# Patient Record
Sex: MALE | Race: WHITE | NOT HISPANIC OR LATINO
[De-identification: names, ages, dates, MRNs, and addresses within clinical notes are randomized per-mention and may not be internally consistent; named-entity substitution may affect disease eponyms.]

---

## 2017-07-08 ENCOUNTER — TRANSCRIPTION ENCOUNTER (OUTPATIENT)
Age: 77
End: 2017-07-08

## 2019-04-29 ENCOUNTER — APPOINTMENT (OUTPATIENT)
Dept: GASTROENTEROLOGY | Facility: CLINIC | Age: 79
End: 2019-04-29
Payer: MEDICARE

## 2019-04-29 VITALS
DIASTOLIC BLOOD PRESSURE: 97 MMHG | BODY MASS INDEX: 21.83 KG/M2 | SYSTOLIC BLOOD PRESSURE: 222 MMHG | HEIGHT: 65 IN | TEMPERATURE: 98.2 F | HEART RATE: 79 BPM | WEIGHT: 131 LBS

## 2019-04-29 DIAGNOSIS — T14.8XXA OTHER INJURY OF UNSPECIFIED BODY REGION, INITIAL ENCOUNTER: ICD-10-CM

## 2019-04-29 DIAGNOSIS — K62.5 HEMORRHAGE OF ANUS AND RECTUM: ICD-10-CM

## 2019-04-29 DIAGNOSIS — K64.8 OTHER HEMORRHOIDS: ICD-10-CM

## 2019-04-29 DIAGNOSIS — K59.00 CONSTIPATION, UNSPECIFIED: ICD-10-CM

## 2019-04-29 DIAGNOSIS — K64.4 RESIDUAL HEMORRHOIDAL SKIN TAGS: ICD-10-CM

## 2019-04-29 DIAGNOSIS — F32.9 MAJOR DEPRESSIVE DISORDER, SINGLE EPISODE, UNSPECIFIED: ICD-10-CM

## 2019-04-29 PROBLEM — Z00.00 ENCOUNTER FOR PREVENTIVE HEALTH EXAMINATION: Noted: 2019-04-29

## 2019-04-29 PROCEDURE — 99215 OFFICE O/P EST HI 40 MIN: CPT

## 2019-04-29 NOTE — HISTORY OF PRESENT ILLNESS
[Constipation] : constipation worsened [de-identified] : Patient with increasing constipation over last several weeks\par He has had rectal bleeding scant on paper for few days with increased straining.  Known hemorrhoids. Reviewed last colonoscopy with pt 2016\par He has added metamucil, prunes, and colace

## 2019-04-29 NOTE — PHYSICAL EXAM
[General Appearance - Alert] : alert [General Appearance - In No Acute Distress] : in no acute distress [Sclera] : the sclera and conjunctiva were normal [Neck Appearance] : the appearance of the neck was normal [Respiration, Rhythm And Depth] : normal respiratory rhythm and effort [Auscultation Breath Sounds / Voice Sounds] : lungs were clear to auscultation bilaterally [Heart Rate And Rhythm] : heart rate was normal and rhythm regular [Heart Sounds] : normal S1 and S2 [Murmurs] : no murmurs [Heart Sounds Pericardial Friction Rub] : no pericardial rub [Bowel Sounds] : normal bowel sounds [Abdomen Soft] : soft [Abdomen Hernia] : no hernia was discovered [Normal Sphincter Tone] : normal sphincter tone [No Rectal Mass] : no rectal mass [External Hemorrhoid] : external hemorrhoids [Cervical Lymph Nodes Enlarged Posterior Bilaterally] : posterior cervical [Abnormal Walk] : normal gait [Oriented To Time, Place, And Person] : oriented to person, place, and time [Impaired Insight] : insight and judgment were intact [Affect] : the affect was normal [Mood] : the mood was normal

## 2019-04-29 NOTE — REVIEW OF SYSTEMS
[Fever] : no fever [As Noted in HPI] : as noted in HPI [Constipation] : constipation [Anxiety] : anxiety [Depression] : depression [Negative] : Heme/Lymph

## 2019-04-29 NOTE — ASSESSMENT
[FreeTextEntry1] : Patient with increased constipation and rectal bleeding\par He has started otc meds and may be helping\par for hemorrhoids will start topical therapy\par if not improved with persistent bleeding, will consider colonoscopy

## 2019-04-30 PROBLEM — Z00.00 ENCOUNTER FOR PREVENTIVE HEALTH EXAMINATION: Status: ACTIVE | Noted: 2019-04-30

## 2019-05-30 ENCOUNTER — APPOINTMENT (OUTPATIENT)
Dept: GASTROENTEROLOGY | Facility: CLINIC | Age: 79
End: 2019-05-30

## 2019-06-24 ENCOUNTER — RX RENEWAL (OUTPATIENT)
Age: 79
End: 2019-06-24

## 2020-09-30 ENCOUNTER — RECORD ABSTRACTING (OUTPATIENT)
Age: 80
End: 2020-09-30

## 2020-09-30 PROBLEM — J32.0 CHRONIC MAXILLARY SINUSITIS: Status: ACTIVE | Noted: 2020-09-30

## 2020-09-30 RX ORDER — FLUTICASONE FUROATE AND VILANTEROL TRIFENATATE 200; 25 UG/1; UG/1
200-25 POWDER RESPIRATORY (INHALATION)
Refills: 0 | Status: ACTIVE | COMMUNITY

## 2020-10-06 ENCOUNTER — APPOINTMENT (OUTPATIENT)
Dept: OTOLARYNGOLOGY | Facility: CLINIC | Age: 80
End: 2020-10-06
Payer: MEDICARE

## 2020-10-06 VITALS
DIASTOLIC BLOOD PRESSURE: 72 MMHG | TEMPERATURE: 97.6 F | HEART RATE: 55 BPM | WEIGHT: 128 LBS | HEIGHT: 65 IN | SYSTOLIC BLOOD PRESSURE: 178 MMHG | BODY MASS INDEX: 21.33 KG/M2

## 2020-10-06 DIAGNOSIS — J32.0 CHRONIC MAXILLARY SINUSITIS: ICD-10-CM

## 2020-10-06 PROCEDURE — 99213 OFFICE O/P EST LOW 20 MIN: CPT | Mod: 25

## 2020-10-06 PROCEDURE — 31231 NASAL ENDOSCOPY DX: CPT

## 2020-10-06 RX ORDER — METHYLPREDNISOLONE 4 MG/1
4 TABLET ORAL
Refills: 0 | Status: DISCONTINUED | COMMUNITY
End: 2020-10-06

## 2020-10-06 RX ORDER — PAROXETINE HYDROCHLORIDE 20 MG/1
20 TABLET, FILM COATED ORAL
Refills: 0 | Status: DISCONTINUED | COMMUNITY
End: 2020-10-06

## 2020-10-06 RX ORDER — AZELASTINE HYDROCHLORIDE 137 UG/1
0.1 SPRAY, METERED NASAL
Refills: 0 | Status: DISCONTINUED | COMMUNITY
End: 2020-10-06

## 2020-10-06 RX ORDER — PRAMOXINE HYDROCHLORIDE HYDROCORTISONE ACETATE 100; 100 MG/10G; MG/10G
1-1 AEROSOL, FOAM TOPICAL
Qty: 10 | Refills: 3 | Status: DISCONTINUED | COMMUNITY
Start: 2019-04-29 | End: 2020-10-06

## 2020-10-06 RX ORDER — PAROXETINE HYDROCHLORIDE 20 MG/1
20 TABLET, FILM COATED ORAL
Refills: 0 | Status: ACTIVE | COMMUNITY

## 2020-10-06 RX ORDER — SULFAMETHOXAZOLE AND TRIMETHOPRIM 800; 160 MG/1; MG/1
800-160 TABLET ORAL
Refills: 0 | Status: DISCONTINUED | COMMUNITY
End: 2020-10-06

## 2020-10-06 NOTE — PROCEDURE
[FreeTextEntry6] : Procedure:  Rigid Nasal Endoscopy: Risks, benefits, and alternatives of rigid endoscopy were explained to the patient.  Specific mention was made of the risk of throat numbness. The patient gave oral consent to proceed.  The nasal cavities were decongested and anesthetized with a combination of oxymetazoline and 4% lidocaine solution.  The rigid scope was inserted into the left nasal cavity.  Endoscopy of the inferior and middle meatus was performed.  No polyp, mass, or lesion was appreciated.  Olfactory cleft was clear. Spheno-ethmoid recess clear. Nasopharynx was clear. Middle meatus open and clear.  Turbinates were without mass. Blood on the cotton. The procedure was repeated on the contralateral side with middle meatus wide open and clear back to the ethmoids, sinus ostium all open.

## 2020-10-06 NOTE — REVIEW OF SYSTEMS
[Seasonal Allergies] : seasonal allergies [Sinus Pain] : sinus pain [Sinus Pressure] : sinus pressure [Sense Of Smell Problem] : sense of smell problem

## 2020-10-06 NOTE — CONSULT LETTER
[Consult Letter:] : I had the pleasure of evaluating your patient, [unfilled]. [Please see my note below.] : Please see my note below. [Consult Closing:] : Thank you very much for allowing me to participate in the care of this patient.  If you have any questions, please do not hesitate to contact me. [Sincerely,] : Sincerely, [Dear  ___] : Dear  [unfilled], [FreeTextEntry3] : Jose Francisco Roach MD FACS

## 2020-10-06 NOTE — HISTORY OF PRESENT ILLNESS
[de-identified] : The patient presents with h/o mild asthma, nasal polyps, anosmia and dysgeusia. He is still experiencing intermittent anosmia and dysgeusia. Pt is covid-19 negative. He has tried the Xhance with no improvement.

## 2020-10-06 NOTE — PHYSICAL EXAM
[Hearing Dan Test (Tuning Fork On Forehead)] : no lateralization of tone [Removed] : previously removed [Normal] : no rashes [Hearing Loss Right Only] : normal [Hearing Loss Left Only] : normal [FreeTextEntry1] : no drip or drainage [FreeTextEntry2] : sinuses nontender to percussion [de-identified] : sensations intact

## 2020-10-06 NOTE — ASSESSMENT
[FreeTextEntry1] : continued intermittent anosmia and dysgeusia\par \par Plan:\par Rigid nasal endoscopy. Continue sinus rinse with Pulmicort. Discussed r/b/a of oral steroids - pt doesn't want to take steroids at this point. FU 6 months.

## 2020-10-06 NOTE — ADDENDUM
[FreeTextEntry1] : Documented by Clarisse Branch acting as scribe for Dr. Roach on 10/06/2020.\par \par All Medical record entries made by the Scribe were at my, Dr. Roach, direction and personally dictated by me on 10/06/2020 . I have reviewed the chart and agree that the record accurately reflects my personal performance of the history, physical exam, assessment and plan. I have also personally directed, reviewed, and agreed with the discharge instructions.

## 2020-12-16 ENCOUNTER — APPOINTMENT (OUTPATIENT)
Dept: UROLOGY | Facility: CLINIC | Age: 80
End: 2020-12-16
Payer: MEDICARE

## 2020-12-16 PROCEDURE — 99204 OFFICE O/P NEW MOD 45 MIN: CPT

## 2020-12-17 LAB
APPEARANCE: CLEAR
BACTERIA: NEGATIVE
BILIRUBIN URINE: NEGATIVE
BLOOD URINE: NEGATIVE
COLOR: YELLOW
GLUCOSE QUALITATIVE U: NEGATIVE
HYALINE CASTS: 2 /LPF
KETONES URINE: NEGATIVE
LEUKOCYTE ESTERASE URINE: NEGATIVE
MICROSCOPIC-UA: NORMAL
NITRITE URINE: NEGATIVE
PH URINE: 6
PROTEIN URINE: NORMAL
PSA FREE FLD-MCNC: 44 %
PSA FREE SERPL-MCNC: 4 NG/ML
PSA SERPL-MCNC: 9.06 NG/ML
RED BLOOD CELLS URINE: 1 /HPF
SPECIFIC GRAVITY URINE: 1.02
SQUAMOUS EPITHELIAL CELLS: 1 /HPF
UROBILINOGEN URINE: NORMAL
WHITE BLOOD CELLS URINE: 0 /HPF

## 2021-03-16 ENCOUNTER — APPOINTMENT (OUTPATIENT)
Dept: GASTROENTEROLOGY | Facility: CLINIC | Age: 81
End: 2021-03-16
Payer: MEDICARE

## 2021-03-16 VITALS
WEIGHT: 125 LBS | HEIGHT: 65 IN | DIASTOLIC BLOOD PRESSURE: 94 MMHG | TEMPERATURE: 98 F | BODY MASS INDEX: 20.83 KG/M2 | SYSTOLIC BLOOD PRESSURE: 171 MMHG | HEART RATE: 94 BPM

## 2021-03-16 DIAGNOSIS — Z12.11 ENCOUNTER FOR SCREENING FOR MALIGNANT NEOPLASM OF COLON: ICD-10-CM

## 2021-03-16 PROCEDURE — 99213 OFFICE O/P EST LOW 20 MIN: CPT

## 2021-03-16 PROCEDURE — 99203 OFFICE O/P NEW LOW 30 MIN: CPT

## 2021-03-16 NOTE — ASSESSMENT
[FreeTextEntry1] : Impression colorectal cancer screening. We'll schedule colonoscopy Left patient first evaluated for frequent PVCs, with cardiology

## 2021-03-16 NOTE — HISTORY OF PRESENT ILLNESS
[FreeTextEntry1] : Patient is seen today for followup screening colonoscopy. His last procedure was several years ago. He denies any change of bowel habits or rectal bleeding and he was advised to have followup colonoscopy. This year. He denies heartburn or dysphagia

## 2021-03-16 NOTE — PHYSICAL EXAM
[General Appearance - Alert] : alert [General Appearance - In No Acute Distress] : in no acute distress [Sclera] : the sclera and conjunctiva were normal [PERRL With Normal Accommodation] : pupils were equal in size, round, and reactive to light [Extraocular Movements] : extraocular movements were intact [Outer Ear] : the ears and nose were normal in appearance [Oropharynx] : the oropharynx was normal [Neck Appearance] : the appearance of the neck was normal [Neck Cervical Mass (___cm)] : no neck mass was observed [Jugular Venous Distention Increased] : there was no jugular-venous distention [Thyroid Diffuse Enlargement] : the thyroid was not enlarged [Thyroid Nodule] : there were no palpable thyroid nodules [Auscultation Breath Sounds / Voice Sounds] : lungs were clear to auscultation bilaterally [Apical Impulse] : the apical impulse was normal [Heart Sounds] : normal S1 and S2 [Heart Sounds Gallop] : no gallops [Murmurs] : no murmurs [Heart Sounds Pericardial Friction Rub] : no pericardial rub [FreeTextEntry1] : frequent PVCs [Bowel Sounds] : normal bowel sounds [Abdomen Soft] : soft [Abdomen Tenderness] : non-tender [] : no hepato-splenomegaly [Abdomen Mass (___ Cm)] : no abdominal mass palpated

## 2021-04-05 ENCOUNTER — APPOINTMENT (OUTPATIENT)
Dept: OTOLARYNGOLOGY | Facility: CLINIC | Age: 81
End: 2021-04-05
Payer: MEDICARE

## 2021-04-05 VITALS
DIASTOLIC BLOOD PRESSURE: 88 MMHG | HEIGHT: 65 IN | BODY MASS INDEX: 20.83 KG/M2 | HEART RATE: 55 BPM | TEMPERATURE: 97.4 F | WEIGHT: 125 LBS | SYSTOLIC BLOOD PRESSURE: 170 MMHG

## 2021-04-05 DIAGNOSIS — Z87.09 PERSONAL HISTORY OF OTHER DISEASES OF THE RESPIRATORY SYSTEM: ICD-10-CM

## 2021-04-05 DIAGNOSIS — R43.0 ANOSMIA: ICD-10-CM

## 2021-04-05 PROCEDURE — 31231 NASAL ENDOSCOPY DX: CPT

## 2021-04-05 PROCEDURE — 99214 OFFICE O/P EST MOD 30 MIN: CPT | Mod: 25

## 2021-04-05 RX ORDER — ALBUTEROL SULFATE 90 UG/1
108 (90 BASE) INHALANT RESPIRATORY (INHALATION)
Refills: 0 | Status: COMPLETED | COMMUNITY
End: 2021-04-05

## 2021-04-05 RX ORDER — AZELASTINE HYDROCHLORIDE 137 UG/1
137 SPRAY, METERED NASAL
Refills: 0 | Status: DISCONTINUED | COMMUNITY
End: 2021-04-05

## 2021-04-05 RX ORDER — BUDESONIDE 1 MG/2ML
SUSPENSION RESPIRATORY (INHALATION)
Refills: 0 | Status: ACTIVE | COMMUNITY

## 2021-04-05 RX ORDER — HYDROCORTISONE ACETATE AND PRAMOXINE HYDROCHLORIDE 25; 10 MG/G; MG/G
2.5-1 CREAM TOPICAL
Qty: 1 | Refills: 3 | Status: COMPLETED | COMMUNITY
Start: 2019-04-29 | End: 2021-04-05

## 2021-04-05 RX ORDER — BUDESONIDE 1 MG/2ML
1 INHALANT ORAL
Refills: 0 | Status: COMPLETED | COMMUNITY
End: 2021-04-05

## 2021-04-05 RX ORDER — FLUTICASONE PROPIONATE 50 UG/1
50 SPRAY, METERED NASAL
Refills: 0 | Status: COMPLETED | COMMUNITY
End: 2021-04-05

## 2021-04-05 RX ORDER — SODIUM SULFATE, POTASSIUM SULFATE, MAGNESIUM SULFATE 17.5; 3.13; 1.6 G/ML; G/ML; G/ML
17.5-3.13-1.6 SOLUTION, CONCENTRATE ORAL
Qty: 1 | Refills: 0 | Status: COMPLETED | COMMUNITY
Start: 2021-03-16 | End: 2021-04-05

## 2021-04-05 NOTE — PHYSICAL EXAM
[Hearing Dan Test (Tuning Fork On Forehead)] : no lateralization of tone [Midline] : trachea located in midline position [Normal] : no rashes [FreeTextEntry1] : deviated septum to the right, not obstructive [FreeTextEntry2] : sinuses nontender to percussion

## 2021-04-05 NOTE — HISTORY OF PRESENT ILLNESS
[de-identified] : The patient presents with h/o mild asthma, nasal polyps, anosmia and dysgeusia. Pt reports doing well. He is still using the sinus rinse with Pulmicort. He went back to Xhance with some improvement. He is still exerpiencing intermittent anosmia and dysgeusia.

## 2021-04-05 NOTE — ADDENDUM
[FreeTextEntry1] : Documented by Clarisse Branch acting as scribe for Dr. Roach on 04/05/2021.\par \par All Medical record entries made by the Scribe were at my, Dr. Roach, direction and personally dictated by me on 04/05/2021 . I have reviewed the chart and agree that the record accurately reflects my personal performance of the history, physical exam, assessment and plan. I have also personally directed, reviewed, and agreed with the discharge instructions.

## 2021-04-05 NOTE — ASSESSMENT
[FreeTextEntry1] : Reviewed and reconciled medications, allergies, PMHx, PSHx, SocHx, FMHx.\par \par h/o mild asthma, nasal polyps, anosmia and dysgeusia - improvement with Xhance and sinus rinse with Pulmicort\par continued intermittent anosmia and dysgeusia\par \par Plan:\par Rigid nasal endoscopy. Discussed r/b/a of Dupixent vs steroid eluting stent. Pt opted to start with the stent.

## 2021-04-05 NOTE — PROCEDURE
[FreeTextEntry6] : Procedure:  Rigid Nasal Endoscopy: Risks, benefits, and alternatives of rigid endoscopy were explained to the patient.  Specific mention was made of the risk of throat numbness. The patient gave oral consent to proceed.  The nasal cavities were decongested and anesthetized with a combination of oxymetazoline and 4% lidocaine solution.  The rigid scope was inserted into the left nasal cavity.  Endoscopy of the inferior and middle meatus was performed.  No polyp, mass, or lesion was appreciated.  Olfactory cleft was clear. Spheno-ethmoid recess clear. Nasopharynx was clear. Polypoid degeneration around the middle turbinate.  The procedure was repeated on the contralateral side with polypoid degeneration around the middle turbinate, no obstruction, sinus ostium open.

## 2021-04-05 NOTE — CONSULT LETTER
[Dear  ___] : Dear  [unfilled], [Courtesy Letter:] : I had the pleasure of seeing your patient, [unfilled], in my office today. [Please see my note below.] : Please see my note below. [Consult Closing:] : Thank you very much for allowing me to participate in the care of this patient.  If you have any questions, please do not hesitate to contact me. [Sincerely,] : Sincerely, [FreeTextEntry3] : Jose Francisco Roach MD FACS

## 2021-06-01 RX ORDER — FLUTICASONE PROPIONATE 93 UG/1
93 SPRAY, METERED NASAL
Qty: 1 | Refills: 5 | Status: ACTIVE | COMMUNITY
Start: 2019-12-12 | End: 1900-01-01

## 2021-06-30 ENCOUNTER — APPOINTMENT (OUTPATIENT)
Dept: UROLOGY | Facility: CLINIC | Age: 81
End: 2021-06-30
Payer: MEDICARE

## 2021-06-30 DIAGNOSIS — R97.20 ELEVATED PROSTATE, SPECIFIC ANTIGEN [PSA]: ICD-10-CM

## 2021-06-30 PROCEDURE — 99214 OFFICE O/P EST MOD 30 MIN: CPT

## 2021-07-01 LAB
APPEARANCE: CLEAR
BACTERIA: NEGATIVE
BILIRUBIN URINE: NEGATIVE
BLOOD URINE: NEGATIVE
COLOR: YELLOW
GLUCOSE QUALITATIVE U: NEGATIVE
HYALINE CASTS: 1 /LPF
KETONES URINE: NEGATIVE
LEUKOCYTE ESTERASE URINE: NEGATIVE
MICROSCOPIC-UA: NORMAL
NITRITE URINE: NEGATIVE
PH URINE: 6.5
PROTEIN URINE: NORMAL
PSA FREE FLD-MCNC: 42 %
PSA FREE SERPL-MCNC: 3.18 NG/ML
PSA SERPL-MCNC: 7.63 NG/ML
RED BLOOD CELLS URINE: 0 /HPF
SPECIFIC GRAVITY URINE: 1.01
SQUAMOUS EPITHELIAL CELLS: 0 /HPF
UROBILINOGEN URINE: NORMAL
WHITE BLOOD CELLS URINE: 1 /HPF

## 2021-10-08 ENCOUNTER — APPOINTMENT (OUTPATIENT)
Dept: OTOLARYNGOLOGY | Facility: CLINIC | Age: 81
End: 2021-10-08
Payer: MEDICARE

## 2021-10-08 VITALS
BODY MASS INDEX: 20.83 KG/M2 | HEIGHT: 65 IN | SYSTOLIC BLOOD PRESSURE: 206 MMHG | DIASTOLIC BLOOD PRESSURE: 84 MMHG | WEIGHT: 125 LBS | HEART RATE: 59 BPM

## 2021-10-08 PROCEDURE — 99213 OFFICE O/P EST LOW 20 MIN: CPT | Mod: 25

## 2021-10-08 PROCEDURE — 31231 NASAL ENDOSCOPY DX: CPT

## 2021-10-08 NOTE — REASON FOR VISIT
[Subsequent Evaluation] : a subsequent evaluation for [FreeTextEntry2] : Patient here to check on sinus

## 2021-10-08 NOTE — PROCEDURE
[Recalcitrant Symptoms] : recalcitrant symptoms  [Anatomical Abnormality] : anatomical abnormality [Anterior rhinoscopy insufficient to account for symptoms] : anterior rhinoscopy insufficient to account for symptoms [None] : none [Rigid Endoscope] : examined with a rigid endoscope [FreeTextEntry6] : Rigid nasal endoscopy\par Consent given\par No polyps seen bilaterally [de-identified] : hx of polyps

## 2021-10-08 NOTE — HISTORY OF PRESENT ILLNESS
[de-identified] : Patient with reduced sense of smell. some days and some meals smells well and everything nl. other days reduced. Using pulmicort in sinus rinse and using xhance.  today bp elevated 204/ 84.  Seeing cardiologist. last visit BP nl. Breathing ok.

## 2021-10-08 NOTE — ASSESSMENT
[FreeTextEntry1] : Hx of polyps  on Pulmicort and Xhance\par \par \par Rigid nasal endoscopy\par Consent given\par No polyps seen bilaterally\par \par Plan\par Decrease Xhance to one spray BID\par See PCP concerning elevated blood pressure\par f/u 3 months\par continue drops for increased eye pressure. followup with optho as well.  \par to see dr Wilks or cardiologist today for elevated bp. repeat 224/80 pulse 58

## 2021-10-08 NOTE — PHYSICAL EXAM
[Hearing Dan Test (Tuning Fork On Forehead)] : no lateralization of tone [Normal] : lingual tonsils are normal [Midline] : trachea located in midline position [FreeTextEntry1] : sinuses non tender

## 2021-11-04 ENCOUNTER — APPOINTMENT (OUTPATIENT)
Dept: DISASTER EMERGENCY | Facility: CLINIC | Age: 81
End: 2021-11-04

## 2021-11-04 DIAGNOSIS — Z01.818 ENCOUNTER FOR OTHER PREPROCEDURAL EXAMINATION: ICD-10-CM

## 2021-11-05 LAB — SARS-COV-2 N GENE NPH QL NAA+PROBE: NOT DETECTED

## 2021-11-08 ENCOUNTER — APPOINTMENT (OUTPATIENT)
Dept: GASTROENTEROLOGY | Facility: AMBULATORY MEDICAL SERVICES | Age: 81
End: 2021-11-08
Payer: MEDICARE

## 2021-11-08 ENCOUNTER — RESULT REVIEW (OUTPATIENT)
Age: 81
End: 2021-11-08

## 2021-11-08 PROCEDURE — 45380 COLONOSCOPY AND BIOPSY: CPT

## 2022-01-26 ENCOUNTER — APPOINTMENT (OUTPATIENT)
Dept: UROLOGY | Facility: CLINIC | Age: 82
End: 2022-01-26
Payer: MEDICARE

## 2022-01-26 PROCEDURE — 99214 OFFICE O/P EST MOD 30 MIN: CPT

## 2022-01-27 LAB
APPEARANCE: CLEAR
BACTERIA: NEGATIVE
BILIRUBIN URINE: NEGATIVE
BLOOD URINE: NEGATIVE
COLOR: YELLOW
GLUCOSE QUALITATIVE U: NEGATIVE
HYALINE CASTS: 1 /LPF
KETONES URINE: NEGATIVE
LEUKOCYTE ESTERASE URINE: NEGATIVE
MICROSCOPIC-UA: NORMAL
NITRITE URINE: NEGATIVE
PH URINE: 6.5
PROTEIN URINE: NORMAL
PSA FREE FLD-MCNC: 45 %
PSA FREE SERPL-MCNC: 3.62 NG/ML
PSA SERPL-MCNC: 8.13 NG/ML
RED BLOOD CELLS URINE: 0 /HPF
SPECIFIC GRAVITY URINE: 1.01
SQUAMOUS EPITHELIAL CELLS: 1 /HPF
UROBILINOGEN URINE: NORMAL
WHITE BLOOD CELLS URINE: 1 /HPF

## 2022-02-07 ENCOUNTER — APPOINTMENT (OUTPATIENT)
Dept: OTOLARYNGOLOGY | Facility: CLINIC | Age: 82
End: 2022-02-07
Payer: MEDICARE

## 2022-02-07 VITALS
HEIGHT: 65 IN | DIASTOLIC BLOOD PRESSURE: 68 MMHG | BODY MASS INDEX: 20.66 KG/M2 | WEIGHT: 124 LBS | HEART RATE: 58 BPM | SYSTOLIC BLOOD PRESSURE: 168 MMHG

## 2022-02-07 DIAGNOSIS — I10 ESSENTIAL (PRIMARY) HYPERTENSION: ICD-10-CM

## 2022-02-07 DIAGNOSIS — J45.909 UNSPECIFIED ASTHMA, UNCOMPLICATED: ICD-10-CM

## 2022-02-07 PROCEDURE — 99213 OFFICE O/P EST LOW 20 MIN: CPT | Mod: 25

## 2022-02-07 PROCEDURE — 31231 NASAL ENDOSCOPY DX: CPT

## 2022-02-07 RX ORDER — AMLODIPINE BESYLATE 5 MG/1
TABLET ORAL
Refills: 0 | Status: ACTIVE | COMMUNITY

## 2022-02-07 RX ORDER — DORZOLAMIDE HYDROCHLORIDE AND TIMOLOL MALEATE 20; 5 MG/ML; MG/ML
SOLUTION/ DROPS OPHTHALMIC
Refills: 0 | Status: ACTIVE | COMMUNITY

## 2022-02-07 RX ORDER — BRIMONIDINE TARTRATE 2 MG/MG
0.2 SOLUTION/ DROPS OPHTHALMIC
Refills: 0 | Status: ACTIVE | COMMUNITY

## 2022-02-07 NOTE — CONSULT LETTER
[Dear  ___] : Dear  [unfilled], [Courtesy Letter:] : I had the pleasure of seeing your patient, [unfilled], in my office today. [Please see my note below.] : Please see my note below. [Consult Closing:] : Thank you very much for allowing me to participate in the care of this patient.  If you have any questions, please do not hesitate to contact me. [Sincerely,] : Sincerely, [FreeTextEntry3] : Dr. Jose Francisco Roach MD FACS

## 2022-02-07 NOTE — HISTORY OF PRESENT ILLNESS
[de-identified] : The patient presents with h/o mild asthma, nasal polyps, anosmia and dysgeusia. He is doing well in general, and presents today for a 3 month f/u. Pt is now evaluated by cardiologist for elevated blood pressure. he reports having high pressure in his eyes, and is monitored by Dr. Maher (Ophthalmologist). The pt's sense of smell is variable and admits to improvement of sense of smell and taste with use of Xhance.

## 2022-02-07 NOTE — PROCEDURE
[FreeTextEntry6] : Procedure: Rigid Nasal Endoscopy: Risks, benefits and alternatives of rigid nasal endoscopy were explained to the patient. The patient gave oral consent to proceed. The rigid  scope was inserted into the left nasal cavity. Endoscopy of the inferior and middle meatus was performed. No polyp, mass or lesion was appreciated. Olfactory cleft was clear. Spheno-ethmoid recess is clear. Nasopharynx was clear. Turbinate were without mass. The procedure was repeated on the contralateral side with a similar findings. Sinus ostium open and clear  \par

## 2022-02-07 NOTE — ADDENDUM
[FreeTextEntry1] : Documented by Ramon Johnson acting as a scribe for Dr. Jose Francisco Roach on (02/07/2022).\par \par All medical record entries made by the Scribe were at my, Dr. Jose Francisco Roach's, direction and personally dictated by me on (02/07/2022). I have reviewed the chart and agree that the record accurately reflects my personal performance of the history, physical exam, assessment and plan. I have also personally directed, reviewed, and agree with the discharge instructions.\par \par

## 2022-02-07 NOTE — ASSESSMENT
[FreeTextEntry1] : Reviewed and reconciled medications, allergies, PMHx, PSHx, SocHx, FMHx.\par \par h/o mild asthma, nasal polyps, anosmia and dysgeusia.\par deviated septum to the right.\par \par Plan: \par Rigid Nasal Endoscopy. FU 6 months.

## 2022-02-07 NOTE — PHYSICAL EXAM
[] : septum deviated to the right [Midline] : trachea located in midline position [Normal] : no masses and lesions seen, face is symmetric [FreeTextEntry2] : Sinuses nontender to percussion.

## 2022-08-04 ENCOUNTER — APPOINTMENT (OUTPATIENT)
Dept: UROLOGY | Facility: CLINIC | Age: 82
End: 2022-08-04

## 2022-08-04 DIAGNOSIS — R97.20 ELEVATED PROSTATE, SPECIFIC ANTIGEN [PSA]: ICD-10-CM

## 2022-08-04 LAB
APPEARANCE: CLEAR
BACTERIA: NEGATIVE
BILIRUBIN URINE: NEGATIVE
BLOOD URINE: NEGATIVE
COLOR: NORMAL
GLUCOSE QUALITATIVE U: NEGATIVE
HYALINE CASTS: 1 /LPF
KETONES URINE: NEGATIVE
LEUKOCYTE ESTERASE URINE: NEGATIVE
MICROSCOPIC-UA: NORMAL
NITRITE URINE: NEGATIVE
PH URINE: 6.5
PROTEIN URINE: NORMAL
PSA FREE FLD-MCNC: 46 %
PSA FREE SERPL-MCNC: 4.7 NG/ML
PSA SERPL-MCNC: 10.2 NG/ML
RED BLOOD CELLS URINE: 0 /HPF
SPECIFIC GRAVITY URINE: 1.01
SQUAMOUS EPITHELIAL CELLS: 0 /HPF
UROBILINOGEN URINE: NORMAL
WHITE BLOOD CELLS URINE: 0 /HPF

## 2022-08-04 PROCEDURE — 99214 OFFICE O/P EST MOD 30 MIN: CPT

## 2022-08-08 ENCOUNTER — APPOINTMENT (OUTPATIENT)
Dept: OTOLARYNGOLOGY | Facility: CLINIC | Age: 82
End: 2022-08-08

## 2022-08-08 VITALS
BODY MASS INDEX: 20.49 KG/M2 | SYSTOLIC BLOOD PRESSURE: 179 MMHG | HEIGHT: 65 IN | DIASTOLIC BLOOD PRESSURE: 76 MMHG | HEART RATE: 56 BPM | WEIGHT: 123 LBS

## 2022-08-08 PROCEDURE — 31231 NASAL ENDOSCOPY DX: CPT

## 2022-08-08 PROCEDURE — 99213 OFFICE O/P EST LOW 20 MIN: CPT | Mod: 25

## 2022-08-08 RX ORDER — FLUTICASONE PROPIONATE 93 UG/1
93 SPRAY, METERED NASAL
Qty: 1 | Refills: 5 | Status: ACTIVE | COMMUNITY
Start: 2022-08-08 | End: 1900-01-01

## 2022-08-08 RX ORDER — BRINZOLAMIDE/BRIMONIDINE TARTRATE 10; 2 MG/ML; MG/ML
1-0.2 SUSPENSION/ DROPS OPHTHALMIC
Qty: 8 | Refills: 0 | Status: DISCONTINUED | COMMUNITY
Start: 2020-12-14 | End: 2022-08-08

## 2022-08-08 NOTE — ASSESSMENT
[FreeTextEntry1] : Reviewed and reconciled medications, allergies, PMHx, PSHx, SocHx, FMHx. \par \par h/o mild asthma, nasal polyps, anosmia and dysgeusia.\par The patient presents today for 6 months follow up on nasal polyps \par \par Physical exam - \par mildly deviated septum, mildly inflamed turbs \par uvula edema, no tonsils, little dry mouth \par \par Flexible nasal endoscopy - \par middle meatus is clear - no polyps or growth b/l \par right side all sinus ostium is clear and open \par \par Plan:\par continue using xhance. Flexible nasal endoscopy. xhance refill prescribed. FU 6 months

## 2022-08-08 NOTE — PROCEDURE
[FreeTextEntry6] : Procedure: Flexible Nasal Endoscopy: Risks, benefits, and alternatives of flexible endoscopy were explained to the patient. The patient gave oral consent to proceed. The flexible scope was inserted into the right nasal cavity. Endoscopy of the inferior and middle meatus was performed. middle meatus is clear - no polyps or growth b/l. right side all sinus ostium is clear and open. No polyp, mass, or lesion was appreciated. Olfactory cleft was clear. Spheno-ethmoid recess is clear. Nasopharynx was clear. Turbinates were without mass. The procedure was repeated on the contralateral side with similar findings.  [de-identified] : nasal polyps

## 2022-08-08 NOTE — HISTORY OF PRESENT ILLNESS
[de-identified] : The patient presents with h/o mild asthma, nasal polyps, anosmia and dysgeusia. The patient presents today for 6 months follow up on nasal polyps. Pt reports his taste and smell is on and off. Pt is still using xhance and his eye pressure is being followed. Pt states seeing cardiologist for the last 8 months and is taking amlodipine for hypertension.

## 2022-08-08 NOTE — PHYSICAL EXAM
[Hearing Dan Test (Tuning Fork On Forehead)] : no lateralization of tone [Normal] : mucosa is normal [Midline] : trachea located in midline position [Removed] : palatine tonsils previously removed [FreeTextEntry1] : mildly deviated septum, mildly inflamed turbs  [de-identified] : uvula edema [de-identified] : little dry mouth

## 2022-08-08 NOTE — ADDENDUM
[FreeTextEntry1] : Documented by Bob Parham acting as scribe for Dr. Roach on 08/08/2022. \par \par All Medical record entries made by the scribe were at my. Dr. Roach direction and personally dictated by me on 08/08/2022. I have reviewed the chart and agree that the record accurately reflects my personal performance of the history, physical exam, assessment and plan. I have also personally directed, reviewed, and agreed with the discharge instructions.

## 2022-08-25 ENCOUNTER — NON-APPOINTMENT (OUTPATIENT)
Age: 82
End: 2022-08-25

## 2022-10-30 ENCOUNTER — NON-APPOINTMENT (OUTPATIENT)
Age: 82
End: 2022-10-30

## 2022-11-09 ENCOUNTER — APPOINTMENT (OUTPATIENT)
Dept: OTOLARYNGOLOGY | Facility: CLINIC | Age: 82
End: 2022-11-09

## 2022-11-09 VITALS
HEIGHT: 65 IN | BODY MASS INDEX: 20.33 KG/M2 | DIASTOLIC BLOOD PRESSURE: 55 MMHG | WEIGHT: 122 LBS | SYSTOLIC BLOOD PRESSURE: 160 MMHG | HEART RATE: 47 BPM

## 2022-11-09 DIAGNOSIS — H69.83 OTHER SPECIFIED DISORDERS OF EUSTACHIAN TUBE, BILATERAL: ICD-10-CM

## 2022-11-09 PROCEDURE — 92557 COMPREHENSIVE HEARING TEST: CPT

## 2022-11-09 PROCEDURE — 99214 OFFICE O/P EST MOD 30 MIN: CPT

## 2022-11-09 PROCEDURE — 92567 TYMPANOMETRY: CPT

## 2022-11-09 RX ORDER — VORTIOXETINE 5 MG/1
5 TABLET, FILM COATED ORAL
Qty: 30 | Refills: 0 | Status: ACTIVE | COMMUNITY
Start: 2022-10-25

## 2022-11-09 RX ORDER — TRIAMCINOLONE ACETONIDE 1 MG/G
0.1 CREAM TOPICAL
Qty: 454 | Refills: 0 | Status: ACTIVE | COMMUNITY
Start: 2022-10-10

## 2022-11-09 RX ORDER — DORZOLAMIDE HYDROCHLORIDE 20 MG/ML
2 SOLUTION OPHTHALMIC
Qty: 10 | Refills: 0 | Status: ACTIVE | COMMUNITY
Start: 2022-09-21

## 2022-11-09 RX ORDER — ROSUVASTATIN CALCIUM 5 MG/1
5 TABLET, FILM COATED ORAL
Qty: 90 | Refills: 0 | Status: ACTIVE | COMMUNITY
Start: 2022-10-24

## 2022-11-09 RX ORDER — CANDESARTAN CILEXETIL 8 MG/1
8 TABLET ORAL
Qty: 90 | Refills: 0 | Status: ACTIVE | COMMUNITY
Start: 2022-08-17

## 2022-11-09 RX ORDER — PAROXETINE HYDROCHLORIDE 10 MG/1
10 TABLET, FILM COATED ORAL
Qty: 4 | Refills: 0 | Status: ACTIVE | COMMUNITY
Start: 2022-09-23

## 2022-11-09 NOTE — ASSESSMENT
[FreeTextEntry1] : \par Reviewed and reconciled medications, allergies, PMHx, PSHx, SocHx, FMHx. \par \par h/o mild asthma, nasal polyps, anosmia and dysgeusia. Presents for clogged ear sensation\par \par Physical exam - \par mildly deviated septum, mildly inflamed turbs\par uvula edema, no tonsils, little dry mouth \par \par Audio: \par Type A tymps AU; abnormal ETF AU\par AD: essenially mild to mod SNHL, 250-8000 Hz\par AS: mild to moderate-severe SNHL, 250-8000 Hz.\par asymmetry noted\par right: 96% discrim at 65db, TPP -12\par left: 96% discrim at 80db, TPP -29\par \par Plan:\par continue using xhance - 2 sprays both side BID, spray laterally. Flexible nasal endoscopy. xhance refill prescribed. Audio - results interpreted by Dr. Roach and reviewed with the patient. FU 6 weeks - if no improvement, ABR or MRI may be necessary.

## 2022-11-09 NOTE — HISTORY OF PRESENT ILLNESS
[de-identified] : The patient presents with h/o mild asthma, nasal polyps, anosmia and dysgeusia. The patient presents today for clogged left ear for the last 2 weeks. States went to Urgent care where he was given Amoxicillin for ear infection. States he only has 1 day left of the medication but the clogged feeling hasn’t gone away. Wants a second opinion regarding his ears.

## 2022-11-09 NOTE — DATA REVIEWED
[de-identified] : Type A tymps AU; abnormal ETF AU\par AD: essenially mild to mod SNHL, 250-8000 Hz\par AS: mild to moderate-severe SNHL, 250-8000 Hz.\par asymmetry noted\par REC: ENT f/u, further testing per MD, re-eval per MD\par

## 2022-11-10 RX ORDER — FLUTICASONE PROPIONATE 93 UG/1
93 SPRAY, METERED NASAL
Qty: 1 | Refills: 5 | Status: ACTIVE | COMMUNITY
Start: 2022-11-09 | End: 1900-01-01

## 2022-11-24 ENCOUNTER — NON-APPOINTMENT (OUTPATIENT)
Age: 82
End: 2022-11-24

## 2022-12-02 ENCOUNTER — NON-APPOINTMENT (OUTPATIENT)
Age: 82
End: 2022-12-02

## 2022-12-30 ENCOUNTER — APPOINTMENT (OUTPATIENT)
Dept: OTOLARYNGOLOGY | Facility: CLINIC | Age: 82
End: 2022-12-30
Payer: MEDICARE

## 2022-12-30 VITALS
SYSTOLIC BLOOD PRESSURE: 163 MMHG | HEIGHT: 65 IN | HEART RATE: 56 BPM | BODY MASS INDEX: 19.99 KG/M2 | WEIGHT: 120 LBS | DIASTOLIC BLOOD PRESSURE: 75 MMHG

## 2022-12-30 DIAGNOSIS — H93.8X9 OTHER SPECIFIED DISORDERS OF EAR, UNSPECIFIED EAR: ICD-10-CM

## 2022-12-30 PROCEDURE — 92557 COMPREHENSIVE HEARING TEST: CPT

## 2022-12-30 PROCEDURE — 92567 TYMPANOMETRY: CPT

## 2022-12-30 PROCEDURE — 99213 OFFICE O/P EST LOW 20 MIN: CPT

## 2022-12-30 NOTE — HISTORY OF PRESENT ILLNESS
[de-identified] : The patient presents with h/o mild asthma, nasal polyps, anosmia and dysgeusia. The patient presents today for 6 weeks follow up on ears. Pt reports to be doing well.

## 2022-12-30 NOTE — ADDENDUM
[FreeTextEntry1] : Documented by Bob Parham acting as scribe for Dr. Roach on 12/30/2022. \par \par All Medical record entries made by the scribe were at my. Dr. Roach direction and personally dictated by me on 12/30/2022. I have reviewed the chart and agree that the record accurately reflects my personal performance of the history, physical exam, assessment and plan. I have also personally directed, reviewed, and agreed with the discharge instructions.

## 2022-12-30 NOTE — DATA REVIEWED
[de-identified] : Type A tymps AU; abnormal ETF\par mild to mod-severe SNHL, 250-8000 Hz, AU.  improvement AS since 11/9/22\par REC: ENT f/u, re-eval per MD, consider AU hearing aids however patient does not perceive issues to pursue

## 2022-12-30 NOTE — ASSESSMENT
[FreeTextEntry1] : Reviewed and reconciled medications, allergies, PMHx, PSHx, SocHx, FMHx. \par \par h/o mild asthma, nasal polyps, anosmia and dysgeusia\par The patient presents today for 6 weeks follow up on ears \par \par last Audio 11/09/2022\par Type A tymps AU; abnormal ETF AU\par AD: essenially mild to mod SNHL, 250-8000 Hz\par AS: mild to moderate-severe SNHL, 250-8000 Hz.\par asymmetry noted\par right: 96% discrim at 65db, TPP -12\par left: 96% discrim at 80db, TPP -29\par \par physical exam - \par cerumen removed via curettage - minimal b/l \par deviated septum to the right, nasal valve collapse on the right \par pigmentation changes left cheek \par \par Audio:\par Type A tymps AU; abnormal ETF\par mild to mod-severe SNHL, 250-8000 Hz, AU.  improvement AS since 11/9/22\par right 96% discrim 65db TPP -5\par left 92% discrim 70db TPP -8\par no hearing aids required unless noticing hearing loss\par \par plan:\par cerumen removed b/l. last audio discussed. Audio - results interpreted by Dr. Roach and reviewed with the patient. continue using nasal spray. no hearing aids required unless noticing hearing loss. FU 6 months

## 2022-12-30 NOTE — PHYSICAL EXAM
[Hearing Dan Test (Tuning Fork On Forehead)] : no lateralization of tone [Midline] : trachea located in midline position [Removed] : palatine tonsils previously removed [Normal] : no masses and lesions seen, face is symmetric [FreeTextEntry8] : cerumen removed via curettage - minimal  [FreeTextEntry9] : cerumen removed via curettage - minimal  [FreeTextEntry1] : deviated septum to the right \par nasal valve collapse on the right  [de-identified] : pigmentation changes left cheek  [FreeTextEntry2] : sinuses nontender to percussion

## 2023-02-08 ENCOUNTER — APPOINTMENT (OUTPATIENT)
Dept: OTOLARYNGOLOGY | Facility: CLINIC | Age: 83
End: 2023-02-08

## 2023-03-16 ENCOUNTER — APPOINTMENT (OUTPATIENT)
Dept: UROLOGY | Facility: CLINIC | Age: 83
End: 2023-03-16
Payer: MEDICARE

## 2023-03-16 PROCEDURE — 99214 OFFICE O/P EST MOD 30 MIN: CPT

## 2023-03-17 LAB
APPEARANCE: CLEAR
BACTERIA: NEGATIVE
BILIRUBIN URINE: NEGATIVE
BLOOD URINE: NEGATIVE
COLOR: NORMAL
GLUCOSE QUALITATIVE U: NEGATIVE
HYALINE CASTS: 0 /LPF
KETONES URINE: NEGATIVE
LEUKOCYTE ESTERASE URINE: NEGATIVE
MICROSCOPIC-UA: NORMAL
NITRITE URINE: NEGATIVE
PH URINE: 6
PROTEIN URINE: NORMAL
PSA FREE FLD-MCNC: 45 %
PSA FREE SERPL-MCNC: 4.9 NG/ML
PSA SERPL-MCNC: 11 NG/ML
RED BLOOD CELLS URINE: 0 /HPF
SPECIFIC GRAVITY URINE: 1.02
SQUAMOUS EPITHELIAL CELLS: 0 /HPF
UROBILINOGEN URINE: NORMAL
WHITE BLOOD CELLS URINE: 0 /HPF

## 2023-06-30 ENCOUNTER — APPOINTMENT (OUTPATIENT)
Dept: OTOLARYNGOLOGY | Facility: CLINIC | Age: 83
End: 2023-06-30
Payer: MEDICARE

## 2023-06-30 VITALS
HEART RATE: 51 BPM | BODY MASS INDEX: 20.83 KG/M2 | WEIGHT: 125 LBS | SYSTOLIC BLOOD PRESSURE: 146 MMHG | HEIGHT: 65 IN | DIASTOLIC BLOOD PRESSURE: 67 MMHG

## 2023-06-30 DIAGNOSIS — J45.909 UNSPECIFIED ASTHMA, UNCOMPLICATED: ICD-10-CM

## 2023-06-30 DIAGNOSIS — R13.12 DYSPHAGIA, OROPHARYNGEAL PHASE: ICD-10-CM

## 2023-06-30 DIAGNOSIS — J38.4 EDEMA OF LARYNX: ICD-10-CM

## 2023-06-30 PROCEDURE — 31231 NASAL ENDOSCOPY DX: CPT

## 2023-06-30 PROCEDURE — 99213 OFFICE O/P EST LOW 20 MIN: CPT | Mod: 25

## 2023-06-30 NOTE — PROCEDURE
[Recalcitrant Symptoms] : recalcitrant symptoms  [FreeTextEntry6] : Nasal Endoscopy with a look at the larynx:\par Left Side:\par -no polyps or growths\par -some mucus posterior pharyngeal wall\par -some edema\par Right Side:\par -sinus wide open and clear\par -no polyps or growths

## 2023-06-30 NOTE — ASSESSMENT
[FreeTextEntry1] : Reviewed and reconciled medications, allergies, PMHx, PSHx, SocHx, FMHx \par \par Pt. with h/o mild asthma, nasal polyps, anosmia, and dysgeusia presents today for a 6 month follow up. Patient states he has been accumulating phlegm lately which has mostly gotten better but he believes it is due to allergies. Patient is still taking Xhance. Patient denies indigestion or heartburn. \par \par Physical Exam:\par -Right Ear: cerumen removed via curettage \par -Left Ear: cerumen removed via curettage \par -mildly inflamed turbinates\par -deviated septum right\par -type 3 oral cavity\par \par Nasal Endoscopy with a look at the larynx:\par Left Side:\par -no polyps or growths\par -some mucus posterior pharyngeal wall\par -some edema\par Right Side:\par -sinus wide open and clear\par -no polyps or growths\par \par Plan: cerumen removed bilaterally. Nasal Endoscopy with a look at the larynx. Reflux diet/instruction sheet provided. FU 6 months

## 2023-06-30 NOTE — PHYSICAL EXAM
[Hearing Dan Test (Tuning Fork On Forehead)] : no lateralization of tone [] : septum deviated to the right [Midline] : trachea located in midline position [Normal] : no rashes [FreeTextEntry8] : cerumen removed via curettage  [FreeTextEntry9] : cerumen removed via curettage  [de-identified] : mildly inflamed turbinates [de-identified] : type 3 oral cavity [FreeTextEntry2] : sensations intact. sinuses nontender to percussion

## 2023-06-30 NOTE — CONSULT LETTER
[Dear  ___] : Dear  [unfilled], [Courtesy Letter:] : I had the pleasure of seeing your patient, [unfilled], in my office today. [Please see my note below.] : Please see my note below. [Consult Closing:] : Thank you very much for allowing me to participate in the care of this patient.  If you have any questions, please do not hesitate to contact me. [Sincerely,] : Sincerely, [FreeTextEntry1] : Jose Francisco Roach MD FACS

## 2023-06-30 NOTE — HISTORY OF PRESENT ILLNESS
[de-identified] : Pt. with h/o mild asthma, nasal polyps, anosmia, and dysgeusia presents today for a 6 month follow up. Patient states he has been accumulating phlegm lately which has mostly gotten better but he believes it is due to allergies. Patient is still taking Xhance. Patient denies indigestion or heartburn.

## 2023-10-11 ENCOUNTER — APPOINTMENT (OUTPATIENT)
Dept: UROLOGY | Facility: CLINIC | Age: 83
End: 2023-10-11
Payer: MEDICARE

## 2023-10-11 DIAGNOSIS — R35.0 FREQUENCY OF MICTURITION: ICD-10-CM

## 2023-10-11 DIAGNOSIS — R97.20 ELEVATED PROSTATE, SPECIFIC ANTIGEN [PSA]: ICD-10-CM

## 2023-10-11 PROCEDURE — 99214 OFFICE O/P EST MOD 30 MIN: CPT

## 2023-10-12 LAB
APPEARANCE: CLEAR
BACTERIA: NEGATIVE /HPF
BILIRUBIN URINE: NEGATIVE
BLOOD URINE: NEGATIVE
CAST: 0 /LPF
COLOR: YELLOW
EPITHELIAL CELLS: 0 /HPF
GLUCOSE QUALITATIVE U: NEGATIVE MG/DL
KETONES URINE: NEGATIVE MG/DL
LEUKOCYTE ESTERASE URINE: NEGATIVE
MICROSCOPIC-UA: NORMAL
NITRITE URINE: NEGATIVE
PH URINE: 6
PROTEIN URINE: NORMAL MG/DL
PSA FREE FLD-MCNC: 49 %
PSA FREE SERPL-MCNC: 4.91 NG/ML
PSA SERPL-MCNC: 10.1 NG/ML
RED BLOOD CELLS URINE: 0 /HPF
SPECIFIC GRAVITY URINE: 1.01
UROBILINOGEN URINE: 0.2 MG/DL
WHITE BLOOD CELLS URINE: 0 /HPF

## 2023-12-08 ENCOUNTER — APPOINTMENT (OUTPATIENT)
Dept: OTOLARYNGOLOGY | Facility: CLINIC | Age: 83
End: 2023-12-08
Payer: MEDICARE

## 2023-12-08 VITALS
WEIGHT: 122 LBS | BODY MASS INDEX: 20.33 KG/M2 | HEIGHT: 65 IN | SYSTOLIC BLOOD PRESSURE: 156 MMHG | HEART RATE: 47 BPM | DIASTOLIC BLOOD PRESSURE: 64 MMHG

## 2023-12-08 DIAGNOSIS — H90.3 SENSORINEURAL HEARING LOSS, BILATERAL: ICD-10-CM

## 2023-12-08 DIAGNOSIS — Z82.49 FAMILY HISTORY OF ISCHEMIC HEART DISEASE AND OTHER DISEASES OF THE CIRCULATORY SYSTEM: ICD-10-CM

## 2023-12-08 DIAGNOSIS — J34.2 DEVIATED NASAL SEPTUM: ICD-10-CM

## 2023-12-08 DIAGNOSIS — J34.89 OTHER SPECIFIED DISORDERS OF NOSE AND NASAL SINUSES: ICD-10-CM

## 2023-12-08 PROCEDURE — 92567 TYMPANOMETRY: CPT

## 2023-12-08 PROCEDURE — 31231 NASAL ENDOSCOPY DX: CPT

## 2023-12-08 PROCEDURE — 92557 COMPREHENSIVE HEARING TEST: CPT

## 2023-12-08 PROCEDURE — 99213 OFFICE O/P EST LOW 20 MIN: CPT | Mod: 25

## 2023-12-08 RX ORDER — FLUTICASONE PROPIONATE 50 UG/1
50 SPRAY, METERED NASAL
Qty: 3 | Refills: 3 | Status: ACTIVE | COMMUNITY
Start: 2023-12-08 | End: 1900-01-01

## 2024-04-03 ENCOUNTER — APPOINTMENT (OUTPATIENT)
Dept: UROLOGY | Facility: CLINIC | Age: 84
End: 2024-04-03
Payer: MEDICARE

## 2024-04-03 DIAGNOSIS — N40.1 BENIGN PROSTATIC HYPERPLASIA WITH LOWER URINARY TRACT SYMPMS: ICD-10-CM

## 2024-04-03 DIAGNOSIS — N13.8 BENIGN PROSTATIC HYPERPLASIA WITH LOWER URINARY TRACT SYMPMS: ICD-10-CM

## 2024-04-03 PROCEDURE — 99214 OFFICE O/P EST MOD 30 MIN: CPT

## 2024-04-03 PROCEDURE — G2211 COMPLEX E/M VISIT ADD ON: CPT

## 2024-04-04 LAB
PSA FREE FLD-MCNC: 51 %
PSA FREE SERPL-MCNC: 4.47 NG/ML
PSA SERPL-MCNC: 8.8 NG/ML

## 2024-06-10 ENCOUNTER — APPOINTMENT (OUTPATIENT)
Dept: OTOLARYNGOLOGY | Facility: CLINIC | Age: 84
End: 2024-06-10
Payer: MEDICARE

## 2024-06-10 VITALS
BODY MASS INDEX: 23.05 KG/M2 | WEIGHT: 135 LBS | SYSTOLIC BLOOD PRESSURE: 147 MMHG | DIASTOLIC BLOOD PRESSURE: 78 MMHG | HEIGHT: 64 IN | HEART RATE: 61 BPM

## 2024-06-10 DIAGNOSIS — R43.0 ANOSMIA: ICD-10-CM

## 2024-06-10 DIAGNOSIS — J32.9 CHRONIC SINUSITIS, UNSPECIFIED: ICD-10-CM

## 2024-06-10 DIAGNOSIS — J33.9 NASAL POLYP, UNSPECIFIED: ICD-10-CM

## 2024-06-10 DIAGNOSIS — H90.5 UNSPECIFIED SENSORINEURAL HEARING LOSS: ICD-10-CM

## 2024-06-10 DIAGNOSIS — J31.0 CHRONIC RHINITIS: ICD-10-CM

## 2024-06-10 PROCEDURE — 31231 NASAL ENDOSCOPY DX: CPT

## 2024-06-10 PROCEDURE — 99213 OFFICE O/P EST LOW 20 MIN: CPT | Mod: 25

## 2024-06-10 NOTE — HISTORY OF PRESENT ILLNESS
[de-identified] : Pt. with h/o mild asthma, nasal polyps, anosmia, asymmetric SNHL, and dysgeusia presents today for a 6 month follow. He states that he still uses sinus rinse with added Budesonide. He denies problems with his hearing.

## 2024-06-10 NOTE — PHYSICAL EXAM
[Hearing Dan Test (Tuning Fork On Forehead)] : no lateralization of tone [] : septum deviated bilaterally [Midline] : trachea located in midline position [Normal] : no rashes [Hearing Loss Right Only] : normal [Hearing Loss Left Only] : normal [FreeTextEntry8] : minimal cerumen removed via curettage [FreeTextEntry9] : minimal cerumen removed via curettage [de-identified] : positive rg maneuver, but not significant enough to change breathing [de-identified] :  mildly inflamed turbinates [de-identified] : no tonsil tissue [FreeTextEntry2] :  sinuses nontender to percussion.  sensations intact

## 2024-06-10 NOTE — ADDENDUM
[FreeTextEntry1] :  Documented by cD Oconnor acting as scribe for Dr. Roach on 06/10/2024. All Medical record entries made by the Scribe were at my, Dr. Roach, direction and personally dictated by me on 06/10/2024 . I have reviewed the chart and agree that the record accurately reflects my personal performance of the history, physical exam, assessment and plan. I have also personally directed, reviewed, and agreed with the discharge instructions.

## 2024-06-10 NOTE — ASSESSMENT
[FreeTextEntry1] :  Reviewed and reconciled medications, allergies, PMHx, PSHx, SocHx, FMHx.  Pt. with h/o mild asthma, nasal polyps, anosmia, asymmetric SNHL, and dysgeusia presents today for a 6 month follow. He states that he still uses sinus rinse with added Budesonide. He denies problems with his hearing.   Physical exam: -right ear canal: minimal cerumen removed via curettage -left ear canal: minimal cerumen removed via curettage -no lateralization to tuning forks -deviated septum bilaterally R>L  mildly inflamed turbinates -positive rg maneuver, but not significant enough to change breathing -no tonsil tissue  ENT Procedure:  Flexible nasal endoscopy followed by Rigid Nasal Endoscopy w/ Bilateral Sinus Debridement -mildly inflamed left middle turbinate -polyp medial to the middle turbinate R>L -ethmoids clear bilaterally -yellow mucus in the right nasal cavity   Plan: -FU in 6 months

## 2024-06-10 NOTE — PROCEDURE
[Recalcitrant Symptoms] : recalcitrant symptoms  [FreeTextEntry6] :  Procedure: Flexible Nasal Endoscopy: Risks, benefits, and alternatives of flexible endoscopy were explained to the patient. The patient gave oral consent to proceed. The flexible scope was inserted into the right nasal cavity. Endoscopy of the inferior and middle meatus was performed. No mass or lesion was appreciated, but there were polyps lateral to the middle turbinate bilaterally R>L. Olfactory cleft was clear. Spheno-ethmoid recess is clear. Nasopharynx was clear. Turbinates were without mass. The procedure was repeated on the contralateral side with similar findings. -mildly inflamed left middle turbinate -polyp medial to the middle turbinate R>L -ethmoids clear bilaterally -yellow mucus in the right nasal cavity   Procedure: Rigid Nasal Endoscopy w/ Bilateral Sinus Debridement: Risks, benefits, and alternatives of rigid endoscopy were explained to the patient. The patient gave oral consent to proceed. The 0 degree scope was inserted into the right nasal cavity. Endoscopy of the inferior and middle meatus was performed. Spheno-ethmoid recess clear. Nasopharynx was clear. Turbinates were without mass.The procedure was repeated on the contralateral side with similar findings. Middle meatus and sinus debridement done bilaterally. The patient tolerated the procedure well.

## 2024-06-19 ENCOUNTER — APPOINTMENT (OUTPATIENT)
Dept: GASTROENTEROLOGY | Facility: CLINIC | Age: 84
End: 2024-06-19

## 2024-08-19 ENCOUNTER — APPOINTMENT (OUTPATIENT)
Dept: OTOLARYNGOLOGY | Facility: CLINIC | Age: 84
End: 2024-08-19
Payer: MEDICARE

## 2024-08-19 PROCEDURE — 92567 TYMPANOMETRY: CPT

## 2024-08-19 PROCEDURE — 92557 COMPREHENSIVE HEARING TEST: CPT

## 2024-08-21 ENCOUNTER — NON-APPOINTMENT (OUTPATIENT)
Age: 84
End: 2024-08-21

## 2024-09-03 ENCOUNTER — NON-APPOINTMENT (OUTPATIENT)
Age: 84
End: 2024-09-03

## 2024-09-25 ENCOUNTER — APPOINTMENT (OUTPATIENT)
Dept: GASTROENTEROLOGY | Facility: CLINIC | Age: 84
End: 2024-09-25

## 2024-09-26 ENCOUNTER — APPOINTMENT (OUTPATIENT)
Dept: PHARMACY | Facility: CLINIC | Age: 84
End: 2024-09-26
Payer: SELF-PAY

## 2024-09-26 PROCEDURE — V5010 ASSESSMENT FOR HEARING AID: CPT

## 2024-11-07 ENCOUNTER — APPOINTMENT (OUTPATIENT)
Dept: UROLOGY | Facility: CLINIC | Age: 84
End: 2024-11-07
Payer: MEDICARE

## 2024-11-07 DIAGNOSIS — N13.8 BENIGN PROSTATIC HYPERPLASIA WITH LOWER URINARY TRACT SYMPMS: ICD-10-CM

## 2024-11-07 DIAGNOSIS — N40.1 BENIGN PROSTATIC HYPERPLASIA WITH LOWER URINARY TRACT SYMPMS: ICD-10-CM

## 2024-11-07 PROCEDURE — 99214 OFFICE O/P EST MOD 30 MIN: CPT

## 2024-11-07 PROCEDURE — G2211 COMPLEX E/M VISIT ADD ON: CPT

## 2024-11-08 LAB
APPEARANCE: CLEAR
BACTERIA: NEGATIVE /HPF
BILIRUBIN URINE: NEGATIVE
BLOOD URINE: NEGATIVE
CAST: 0 /LPF
COLOR: YELLOW
EPITHELIAL CELLS: 0 /HPF
GLUCOSE QUALITATIVE U: NEGATIVE MG/DL
KETONES URINE: NEGATIVE MG/DL
LEUKOCYTE ESTERASE URINE: NEGATIVE
MICROSCOPIC-UA: NORMAL
NITRITE URINE: NEGATIVE
PH URINE: 6
PROTEIN URINE: NEGATIVE MG/DL
PSA FREE FLD-MCNC: 53 %
PSA FREE SERPL-MCNC: 4.21 NG/ML
PSA SERPL-MCNC: 7.92 NG/ML
RED BLOOD CELLS URINE: 0 /HPF
SPECIFIC GRAVITY URINE: 1.01
UROBILINOGEN URINE: 0.2 MG/DL
WHITE BLOOD CELLS URINE: 0 /HPF

## 2024-12-03 ENCOUNTER — APPOINTMENT (OUTPATIENT)
Dept: OTOLARYNGOLOGY | Facility: CLINIC | Age: 84
End: 2024-12-03
Payer: MEDICARE

## 2024-12-03 ENCOUNTER — NON-APPOINTMENT (OUTPATIENT)
Age: 84
End: 2024-12-03

## 2024-12-03 VITALS
HEIGHT: 64 IN | BODY MASS INDEX: 20.83 KG/M2 | DIASTOLIC BLOOD PRESSURE: 69 MMHG | WEIGHT: 122 LBS | SYSTOLIC BLOOD PRESSURE: 146 MMHG | HEART RATE: 57 BPM

## 2024-12-03 DIAGNOSIS — J31.0 CHRONIC RHINITIS: ICD-10-CM

## 2024-12-03 DIAGNOSIS — J34.2 DEVIATED NASAL SEPTUM: ICD-10-CM

## 2024-12-03 DIAGNOSIS — F32.A DEPRESSION, UNSPECIFIED: ICD-10-CM

## 2024-12-03 DIAGNOSIS — H90.3 SENSORINEURAL HEARING LOSS, BILATERAL: ICD-10-CM

## 2024-12-03 PROCEDURE — 92557 COMPREHENSIVE HEARING TEST: CPT

## 2024-12-03 PROCEDURE — 92567 TYMPANOMETRY: CPT

## 2024-12-03 PROCEDURE — 99213 OFFICE O/P EST LOW 20 MIN: CPT

## 2025-01-01 ENCOUNTER — NON-APPOINTMENT (OUTPATIENT)
Age: 85
End: 2025-01-01

## 2025-01-21 ENCOUNTER — OUTPATIENT (OUTPATIENT)
Dept: OUTPATIENT SERVICES | Facility: HOSPITAL | Age: 85
LOS: 1 days | End: 2025-01-21
Payer: MEDICARE

## 2025-01-21 ENCOUNTER — APPOINTMENT (OUTPATIENT)
Dept: MRI IMAGING | Facility: CLINIC | Age: 85
End: 2025-01-21
Payer: MEDICARE

## 2025-01-21 DIAGNOSIS — Z00.00 ENCOUNTER FOR GENERAL ADULT MEDICAL EXAMINATION WITHOUT ABNORMAL FINDINGS: ICD-10-CM

## 2025-01-21 PROCEDURE — 70553 MRI BRAIN STEM W/O & W/DYE: CPT | Mod: 26

## 2025-01-21 PROCEDURE — A9585: CPT

## 2025-01-21 PROCEDURE — 70553 MRI BRAIN STEM W/O & W/DYE: CPT | Mod: MH

## 2025-01-31 ENCOUNTER — APPOINTMENT (OUTPATIENT)
Dept: PHARMACY | Facility: CLINIC | Age: 85
End: 2025-01-31
Payer: SELF-PAY

## 2025-01-31 PROCEDURE — V5010 ASSESSMENT FOR HEARING AID: CPT

## 2025-02-12 ENCOUNTER — RESULT REVIEW (OUTPATIENT)
Age: 85
End: 2025-02-12

## 2025-02-12 ENCOUNTER — APPOINTMENT (OUTPATIENT)
Dept: OTOLARYNGOLOGY | Facility: CLINIC | Age: 85
End: 2025-02-12
Payer: MEDICARE

## 2025-02-12 ENCOUNTER — NON-APPOINTMENT (OUTPATIENT)
Age: 85
End: 2025-02-12

## 2025-02-12 VITALS
BODY MASS INDEX: 20.49 KG/M2 | SYSTOLIC BLOOD PRESSURE: 146 MMHG | DIASTOLIC BLOOD PRESSURE: 64 MMHG | HEIGHT: 64 IN | WEIGHT: 120 LBS | HEART RATE: 61 BPM

## 2025-02-12 DIAGNOSIS — J32.2 CHRONIC ETHMOIDAL SINUSITIS: ICD-10-CM

## 2025-02-12 DIAGNOSIS — J34.2 DEVIATED NASAL SEPTUM: ICD-10-CM

## 2025-02-12 DIAGNOSIS — J32.3 CHRONIC SPHENOIDAL SINUSITIS: ICD-10-CM

## 2025-02-12 DIAGNOSIS — J32.0 CHRONIC MAXILLARY SINUSITIS: ICD-10-CM

## 2025-02-12 DIAGNOSIS — J32.9 CHRONIC SINUSITIS, UNSPECIFIED: ICD-10-CM

## 2025-02-12 DIAGNOSIS — J31.0 CHRONIC RHINITIS: ICD-10-CM

## 2025-02-12 DIAGNOSIS — H90.5 UNSPECIFIED SENSORINEURAL HEARING LOSS: ICD-10-CM

## 2025-02-12 DIAGNOSIS — J34.89 OTHER SPECIFIED DISORDERS OF NOSE AND NASAL SINUSES: ICD-10-CM

## 2025-02-12 PROCEDURE — 99214 OFFICE O/P EST MOD 30 MIN: CPT | Mod: 25

## 2025-02-12 PROCEDURE — 70486 CT MAXILLOFACIAL W/O DYE: CPT

## 2025-02-12 PROCEDURE — 31231 NASAL ENDOSCOPY DX: CPT

## 2025-02-27 ENCOUNTER — APPOINTMENT (OUTPATIENT)
Dept: NEUROLOGY | Facility: CLINIC | Age: 85
End: 2025-02-27

## 2025-02-28 ENCOUNTER — APPOINTMENT (OUTPATIENT)
Dept: PSYCHIATRY | Facility: CLINIC | Age: 85
End: 2025-02-28

## 2025-03-19 ENCOUNTER — APPOINTMENT (OUTPATIENT)
Dept: OTOLARYNGOLOGY | Facility: CLINIC | Age: 85
End: 2025-03-19
Payer: MEDICARE

## 2025-03-19 VITALS
HEART RATE: 80 BPM | BODY MASS INDEX: 20.49 KG/M2 | WEIGHT: 120 LBS | SYSTOLIC BLOOD PRESSURE: 104 MMHG | DIASTOLIC BLOOD PRESSURE: 66 MMHG | HEIGHT: 64 IN

## 2025-03-19 DIAGNOSIS — R43.0 ANOSMIA: ICD-10-CM

## 2025-03-19 DIAGNOSIS — J32.0 CHRONIC MAXILLARY SINUSITIS: ICD-10-CM

## 2025-03-19 DIAGNOSIS — J32.3 CHRONIC SPHENOIDAL SINUSITIS: ICD-10-CM

## 2025-03-19 DIAGNOSIS — J31.0 CHRONIC RHINITIS: ICD-10-CM

## 2025-03-19 DIAGNOSIS — J32.2 CHRONIC ETHMOIDAL SINUSITIS: ICD-10-CM

## 2025-03-19 PROCEDURE — 99213 OFFICE O/P EST LOW 20 MIN: CPT

## 2025-03-31 ENCOUNTER — APPOINTMENT (OUTPATIENT)
Dept: PSYCHIATRY | Facility: CLINIC | Age: 85
End: 2025-03-31
Payer: MEDICARE

## 2025-03-31 DIAGNOSIS — F33.9 MAJOR DEPRESSIVE DISORDER, RECURRENT, UNSPECIFIED: ICD-10-CM

## 2025-03-31 PROCEDURE — 99205 OFFICE O/P NEW HI 60 MIN: CPT

## 2025-04-07 PROBLEM — F33.9 MDD (MAJOR DEPRESSIVE DISORDER), RECURRENT EPISODE: Status: ACTIVE | Noted: 2025-04-07

## 2025-04-25 ENCOUNTER — APPOINTMENT (OUTPATIENT)
Dept: PSYCHIATRY | Facility: CLINIC | Age: 85
End: 2025-04-25

## 2025-05-08 ENCOUNTER — NON-APPOINTMENT (OUTPATIENT)
Age: 85
End: 2025-05-08

## 2025-05-08 ENCOUNTER — APPOINTMENT (OUTPATIENT)
Dept: UROLOGY | Facility: CLINIC | Age: 85
End: 2025-05-08
Payer: MEDICARE

## 2025-05-08 DIAGNOSIS — N40.1 BENIGN PROSTATIC HYPERPLASIA WITH LOWER URINARY TRACT SYMPMS: ICD-10-CM

## 2025-05-08 DIAGNOSIS — N13.8 BENIGN PROSTATIC HYPERPLASIA WITH LOWER URINARY TRACT SYMPMS: ICD-10-CM

## 2025-05-08 LAB
PSA FREE FLD-MCNC: 44 %
PSA FREE SERPL-MCNC: 4.46 NG/ML
PSA SERPL-MCNC: 10.2 NG/ML

## 2025-05-08 PROCEDURE — 99214 OFFICE O/P EST MOD 30 MIN: CPT

## 2025-05-08 PROCEDURE — G2211 COMPLEX E/M VISIT ADD ON: CPT

## 2025-05-09 LAB
APPEARANCE: CLEAR
BACTERIA: NEGATIVE /HPF
BILIRUBIN URINE: NEGATIVE
BLOOD URINE: NEGATIVE
CAST: 0 /LPF
COLOR: YELLOW
EPITHELIAL CELLS: 0 /HPF
GLUCOSE QUALITATIVE U: NEGATIVE MG/DL
KETONES URINE: NEGATIVE MG/DL
LEUKOCYTE ESTERASE URINE: NEGATIVE
MICROSCOPIC-UA: NORMAL
NITRITE URINE: NEGATIVE
PH URINE: 6
PROTEIN URINE: NEGATIVE MG/DL
RED BLOOD CELLS URINE: 0 /HPF
SPECIFIC GRAVITY URINE: 1.01
UROBILINOGEN URINE: 0.2 MG/DL
WHITE BLOOD CELLS URINE: 0 /HPF

## 2025-06-11 ENCOUNTER — APPOINTMENT (OUTPATIENT)
Dept: OTOLARYNGOLOGY | Facility: CLINIC | Age: 85
End: 2025-06-11

## 2025-06-25 ENCOUNTER — APPOINTMENT (OUTPATIENT)
Dept: OTOLARYNGOLOGY | Facility: CLINIC | Age: 85
End: 2025-06-25
Payer: MEDICARE

## 2025-06-25 VITALS
DIASTOLIC BLOOD PRESSURE: 57 MMHG | BODY MASS INDEX: 21 KG/M2 | HEIGHT: 64 IN | HEART RATE: 47 BPM | WEIGHT: 123 LBS | SYSTOLIC BLOOD PRESSURE: 167 MMHG

## 2025-06-25 PROCEDURE — 31231 NASAL ENDOSCOPY DX: CPT

## 2025-06-25 PROCEDURE — 99213 OFFICE O/P EST LOW 20 MIN: CPT | Mod: 25
